# Patient Record
Sex: MALE
[De-identification: names, ages, dates, MRNs, and addresses within clinical notes are randomized per-mention and may not be internally consistent; named-entity substitution may affect disease eponyms.]

---

## 2024-02-20 ENCOUNTER — APPOINTMENT (OUTPATIENT)
Dept: OTOLARYNGOLOGY | Facility: CLINIC | Age: 29
End: 2024-02-20
Payer: MEDICAID

## 2024-02-20 VITALS
WEIGHT: 210 LBS | SYSTOLIC BLOOD PRESSURE: 147 MMHG | HEART RATE: 100 BPM | DIASTOLIC BLOOD PRESSURE: 79 MMHG | HEIGHT: 68 IN | BODY MASS INDEX: 31.83 KG/M2

## 2024-02-20 DIAGNOSIS — Z78.9 OTHER SPECIFIED HEALTH STATUS: ICD-10-CM

## 2024-02-20 PROBLEM — Z00.00 ENCOUNTER FOR PREVENTIVE HEALTH EXAMINATION: Status: ACTIVE | Noted: 2024-02-20

## 2024-02-20 PROCEDURE — 31231 NASAL ENDOSCOPY DX: CPT

## 2024-02-20 PROCEDURE — 99204 OFFICE O/P NEW MOD 45 MIN: CPT | Mod: 25

## 2024-02-20 RX ORDER — FLUTICASONE PROPIONATE 50 UG/1
50 SPRAY, METERED NASAL TWICE DAILY
Qty: 1 | Refills: 2 | Status: ACTIVE | COMMUNITY
Start: 2024-02-20 | End: 1900-01-01

## 2024-02-21 NOTE — HISTORY OF PRESENT ILLNESS
[de-identified] : 28 year old male here for nasal congestion and difficulty breathing through nose, reports history of breaking his nose when he was 16. Reports nasal congestion for 3 years, sinus pain and pressure, anterior rhinorrhea. Denies PND, anosmia. Reports using saline nasal spray daily. Denies CT scan of sinuses.

## 2024-02-21 NOTE — PLAN
[TextEntry] : He will start flonase BID for the next 4-6 weeks. If there is no resolution, we will then discuss septoplasty/turbinate reduction.  Follow up in 4-6 weeks.

## 2024-02-21 NOTE — CONSULT LETTER
[Dear  ___] : Dear  [unfilled], [Courtesy Letter:] : I had the pleasure of seeing your patient, [unfilled], in my office today. [Please see my note below.] : Please see my note below. [Sincerely,] : Sincerely, [FreeTextEntry2] : Dr. Jeronimo Welch [FreeTextEntry3] : Lenny Taylor MD, [facility]oscopic Skull [facility] Surgery Head and Neck Surgery   [address] [facility], [address] [place] [id number] Tel: [phone] Fax:[phone]

## 2024-02-21 NOTE — PROCEDURE
[FreeTextEntry6] : Nasal Endoscopy: Procedure: Bilateral nasal endoscopy (CPT 97541)   Indication: Anterior rhinoscopy was inadequate to evaluate pathology.  Left: Spur to R Moderate inferior turbinate hypertrophy, MT hypertrophy Middle meatus clear, without masses, polyps, or pus Sphenoethmoidal recess clear, without masses, polyps, or pus  Right:  Spur to R Moderate inferior turbinate hypertrophy, MT hypertrophy Middle meatus clear, without masses, polyps, or pus  Sphenoethmoidal recess clear, without masses, polyps, or pus

## 2024-05-22 PROBLEM — J30.9 ALLERGIC RHINITIS: Status: ACTIVE | Noted: 2024-02-20

## 2024-05-22 PROBLEM — J34.2 DNS (DEVIATED NASAL SEPTUM): Status: ACTIVE | Noted: 2024-02-20

## 2024-05-22 NOTE — PROCEDURE
[FreeTextEntry6] : Nasal Endoscopy: Procedure: Bilateral nasal endoscopy (CPT 12732)   Indication: Anterior rhinoscopy was inadequate to evaluate pathology.  Left: Spur to R Moderate inferior turbinate hypertrophy, MT hypertrophy Middle meatus clear, without masses, polyps, or pus Sphenoethmoidal recess clear, without masses, polyps, or pus  Right:  Spur to R Moderate inferior turbinate hypertrophy, MT hypertrophy Middle meatus clear, without masses, polyps, or pus  Sphenoethmoidal recess clear, without masses, polyps, or pus

## 2024-05-22 NOTE — HISTORY OF PRESENT ILLNESS
[de-identified] : Update 5/23/24: f/u [facility] and DNS. He has been using saline irrigations and flonase since prior visit without relief.  28 year old male here for nasal congestion and difficulty breathing through nose, reports history of breaking his nose when he was 16. Reports nasal congestion for 3 years, sinus pain and pressure, anterior rhinorrhea. Denies PND, anosmia. Reports using saline nasal spray daily. Denies CT scan of sinuses.

## 2024-05-23 ENCOUNTER — APPOINTMENT (OUTPATIENT)
Dept: OTOLARYNGOLOGY | Facility: CLINIC | Age: 29
End: 2024-05-23

## 2024-05-23 DIAGNOSIS — J30.9 ALLERGIC RHINITIS, UNSPECIFIED: ICD-10-CM

## 2024-05-23 DIAGNOSIS — J34.2 DEVIATED NASAL SEPTUM: ICD-10-CM
